# Patient Record
Sex: FEMALE | Race: WHITE | NOT HISPANIC OR LATINO | Employment: OTHER | URBAN - METROPOLITAN AREA
[De-identification: names, ages, dates, MRNs, and addresses within clinical notes are randomized per-mention and may not be internally consistent; named-entity substitution may affect disease eponyms.]

---

## 2024-06-17 ENCOUNTER — HOSPITAL ENCOUNTER (OUTPATIENT)
Dept: RADIOLOGY | Facility: CLINIC | Age: 64
Discharge: HOME | End: 2024-06-17
Attending: REHABILITATION UNIT
Payer: MEDICARE

## 2024-06-17 ENCOUNTER — OFFICE VISIT (OUTPATIENT)
Dept: ORTHOPEDICS | Facility: CLINIC | Age: 64
End: 2024-06-17
Payer: MEDICARE

## 2024-06-17 VITALS
BODY MASS INDEX: 30.05 KG/M2 | HEART RATE: 75 BPM | DIASTOLIC BLOOD PRESSURE: 72 MMHG | SYSTOLIC BLOOD PRESSURE: 107 MMHG | WEIGHT: 187 LBS | HEIGHT: 66 IN

## 2024-06-17 DIAGNOSIS — M25.511 RIGHT SHOULDER PAIN, UNSPECIFIED CHRONICITY: ICD-10-CM

## 2024-06-17 DIAGNOSIS — M25.561 RIGHT KNEE PAIN, UNSPECIFIED CHRONICITY: ICD-10-CM

## 2024-06-17 DIAGNOSIS — M25.511 RIGHT SHOULDER PAIN, UNSPECIFIED CHRONICITY: Primary | ICD-10-CM

## 2024-06-17 PROCEDURE — 99203 OFFICE O/P NEW LOW 30 MIN: CPT | Mod: ,,, | Performed by: REHABILITATION UNIT

## 2024-06-17 PROCEDURE — 3078F DIAST BP <80 MM HG: CPT | Mod: CPTII,,, | Performed by: REHABILITATION UNIT

## 2024-06-17 PROCEDURE — 73564 X-RAY EXAM KNEE 4 OR MORE: CPT | Mod: RT,,, | Performed by: REHABILITATION UNIT

## 2024-06-17 PROCEDURE — 1159F MED LIST DOCD IN RCRD: CPT | Mod: CPTII,,, | Performed by: REHABILITATION UNIT

## 2024-06-17 PROCEDURE — 3074F SYST BP LT 130 MM HG: CPT | Mod: CPTII,,, | Performed by: REHABILITATION UNIT

## 2024-06-17 PROCEDURE — 3008F BODY MASS INDEX DOCD: CPT | Mod: CPTII,,, | Performed by: REHABILITATION UNIT

## 2024-06-17 PROCEDURE — 73030 X-RAY EXAM OF SHOULDER: CPT | Mod: RT,,, | Performed by: REHABILITATION UNIT

## 2024-06-17 RX ORDER — APREMILAST 30 MG/1
TABLET, FILM COATED ORAL
COMMUNITY

## 2024-06-17 RX ORDER — DICLOFENAC SODIUM 30 MG/G
GEL TOPICAL
COMMUNITY
Start: 2024-05-06

## 2024-06-17 RX ORDER — DULOXETIN HYDROCHLORIDE 60 MG/1
60 CAPSULE, DELAYED RELEASE ORAL
COMMUNITY

## 2024-06-17 RX ORDER — TIZANIDINE HYDROCHLORIDE 4 MG/1
4 CAPSULE, GELATIN COATED ORAL 3 TIMES DAILY PRN
COMMUNITY

## 2024-06-17 RX ORDER — HYDROXYZINE HYDROCHLORIDE 25 MG/1
25 TABLET, FILM COATED ORAL 3 TIMES DAILY PRN
COMMUNITY
Start: 2024-01-24

## 2024-06-17 RX ORDER — SERTRALINE HYDROCHLORIDE 50 MG/1
50 TABLET, FILM COATED ORAL EVERY MORNING
COMMUNITY

## 2024-06-17 RX ORDER — BUSPIRONE HYDROCHLORIDE 5 MG/1
5 TABLET ORAL 3 TIMES DAILY PRN
COMMUNITY
Start: 2024-01-24

## 2024-06-17 RX ORDER — GABAPENTIN 400 MG/1
400 CAPSULE ORAL 3 TIMES DAILY
COMMUNITY

## 2024-06-17 NOTE — LETTER
June 17, 2024        Camron De Guzman MD  [address]   [address]  [facility] [address]              [facility]  [address] [place] [address], [address]  [facility] [address]  Phone: [phone]  Fax: [phone]   Patient: Leigh Oates   MR Number: [id number]   YOB: 1960   Date of Visit: 6/17/2024     Dear Dr. De Guzman,     Please see attached office visit note.     Sincerely,      Akbar Hannah MD            CC    No Recipients    Enclosure

## 2024-06-17 NOTE — PROGRESS NOTES
Subjective:      Patient ID: Leigh Oates is a 63 y.o. female.    Chief Complaint: Pain (Rt shoulder and rt knee sx 2021 - ongoing pain for about 2 months. Reports traveling pain from shoulder down into forearm. States had a injection May 24 in shoulder which did not help with pain. States knee pops, catches and gets stiff at times.  )    HPI:   Leigh Oates is a 63 y.o. female who presents today for initial evaluation of right shoulder and knee    History of Present Illness  The patient presents for evaluation of right shoulder and knee pain.    The patient reports experiencing pain in her right shoulder, which intensifies when she applies any pressure to it while seated. The pain, which radiates down her arm and into her elbow, has been persisting for the past few months. Despite being right-handed, she denies any specific injury that could have precipitated the pain. She has previously received an injection from Dr. Gary Calloway, which did not provide any relief. Despite the pain, she maintains an active lifestyle. She denies any numbness or tingling in her arm. She has not engaged in any physical therapy. She sees pain provider for back.     The patient underwent a knee replacement in 11/2021, performed by Dr. Diana. She reports an improvement in her condition post-surgery. However, she has been experiencing discomfort in her knee for the past 6 months. She describes a catching sensation in her knee, which becomes painful and pops when she bends it. She denies any complications or issues with wound healing post-surgery. She denies any swelling or warmth in her knee.    Supplemental Information  She has herniated disks in her neck and lumbar spine. She has an appointment with Dr. Holguin in 09/2023. She is seeing pain management for her back. She is scheduled for an epidural injection in her lumbar spine on Thursday.    History reviewed. No pertinent past medical history.  Past Surgical History:   Procedure  Laterality Date     SECTION  1979, 1982,1984    CHOLECYSTECTOMY  2019    HERNIA REPAIR  2019    Done with Gall bladder    JOINT REPLACEMENT  2021    TUBAL LIGATION  1984     Social History     Socioeconomic History    Marital status:    Tobacco Use    Smoking status: Former     Current packs/day: 0.00     Types: Cigarettes     Quit date: 1978     Years since quittin.9    Smokeless tobacco: Never   Substance and Sexual Activity    Alcohol use: Not Currently    Drug use: Not Currently    Sexual activity: Not Currently   Social History Narrative    ** Merged History Encounter **          Social Determinants of Health     Financial Resource Strain: Medium Risk (11/15/2022)    [facility]    Overall Financial Resource Strain (CARDIA)     Difficulty of Paying Living Expenses: Somewhat hard   Food Insecurity: Food Insecurity Present (11/15/2022)    Received from [facility] and Affiliates    Hunger Vital Sign     Worried About Running Out of Food in the Last Year: Often true     Ran Out of Food in the Last Year: Often true   Transportation Needs: No Transportation Needs (11/15/2022)    Received from [facility] and [facility]    PRAPARE - Transportation     Lack of Transportation (Medical): No     Lack of Transportation (Non-Medical): No   Physical Activity: Inactive (11/15/2022)    [facility]    Exercise Vital Sign     Days of Exercise per Week: 0 days     Minutes of Exercise per Session: 0 min   Stress: Stress Concern Present (11/15/2022)    Received from [facility] and [facility]    [facility]  "Occupational Health - Occupational Stress Questionnaire     Feeling of Stress : To some extent         Current Outpatient Medications:     busPIRone (BUSPAR) 5 MG Tab, Take 5 mg by mouth 3 (three) times daily as needed., Disp: , Rfl:     diclofenac sodium (SOLARAZE) 3 % gel, Apply to affected area 3-4x/day, Disp: , Rfl:     DULoxetine (CYMBALTA) 60 MG capsule, Take 60 mg by mouth., Disp: , Rfl:     gabapentin (NEURONTIN) 400 MG capsule, Take 400 mg by mouth 3 (three) times daily., Disp: , Rfl:     hydrOXYzine HCL (ATARAX) 25 MG tablet, Take 25 mg by mouth 3 (three) times daily as needed., Disp: , Rfl:     OTEZLA 30 mg Tab, , Disp: , Rfl:     sertraline (ZOLOFT) 50 MG tablet, Take 50 mg by mouth every morning., Disp: , Rfl:     tiZANidine 4 mg Cap, Take 4 mg by mouth 3 (three) times daily as needed., Disp: , Rfl:   Review of patient's allergies indicates:   Allergen Reactions    Nsaids (non-steroidal anti-inflammatory drug)        /72 (BP Location: Left arm, Patient Position: Sitting, BP Method: Large (Automatic))   Pulse 75   Ht 5' 6" (1.676 m)   Wt 84.8 kg (187 lb)   BMI 30.18 kg/m²     Comprehensive review of systems completed and negative except as per HPI.        Objective:   Head: Normocephalic, without obvious abnormality, atraumatic  Eyes: conjunctivae/corneas clear. EOM's intact  Ears: normal external appearance  Nose: Nares normal. Septum midline. Mucosa normal. No drainage  Throat: normal findings: lips normal without lesions  Lungs: unlabored breathing on room air  Chest wall: symmetric chest rise  Heart: regular rate and rhythm  Pulses: 2+ and symmetric  Skin: Skin color, texture, turgor normal. No rashes or lesions  Neurologic: Grossly normal    right SHOULDER    Appearance:   normal    Cervical Spine:   Decreased motion with pain    Tenderness:   diffuse    AROM:   , Abduction 150, ER 60, IR sacrum    PROM:  same    Pain:  AROM: Positive  PROM:  Positive  End ROM: " Positive  Supraspinatus strength testing: Negative  External rotation strength testing: Negative  Trisha-scapular: Negative  Virtually all provacative maneuvers Negative    Strength:  Supraspinatus: intact  External rotation: intact  Trisha-scapular: intact    Provocative Maneuvers:     Rotator Cuff/Biceps/AC Joint  Neer's Sign: Positive  Hawkin's Test: Positive  Painful arc: Positive  Belly Press: Negative  Bear Hugger Test: Negative  Hornblower's Sign: Negative  Speed's Test: Positive  Yergeson's Test: Positive  Cross Arm Abduction: Positive    Pulses: Palpable radial pulse    Neurological deficits: None    The patient has a warm and well-perfused upper extremity with capillary refill less than 2 seconds. Sensation is intact to light touch in terminal nerve distributions. 5/5 ain/pin/uln. The patient has no palpable epitrochlear lymphadenopathy.    right KNEE:     Alignment: neutral  Appearance: skin is intact without lesion with well healed surgical incision   Effusion: none  Gait: antalgic  Straight Leg Raise: negative  Log Roll: negative  Hip ROM: full and painless  Ankle ROM: full and painless   Knee ROM:  0 - 125  Tenderness: mild diffuse TTP    Patellar Mobility: normal  Valgus Stress @ 0 deg: stable  Valgus Stress @ 30 deg: stable  Varus Stress @ 0 deg: stable  Varus Stress @ 30 deg: stable  Lachman: stable  Ant Drawer: negative   Post Drawer: negative  Posterior Sag Sign: negative  Neurological deficits: SILT dp/sp/t distributions  Motor: 5/5 EHL/FHL/TA/GS    Warm well perfused lower extremity with capillary refill less than 2 seconds and sensation intact to light touch in the terminal nerve distributions. Calf soft and easily compressible without clinical sign of DVT. No palpable popliteal lymphadenopathy      Assessment:     Imaging:   Four view radiographs of the right shoulder obtained today personally reviewed showing no acute fractures or dislocations.  Joint spaces are well maintained.  Humeral head  remains seated centrally within the glenoid.    Four view weight bearing radiographs of the right knee obtained today personally reviewed showing no acute fractures or dislocations. TKA components in place with no gross signs of loosening or failure.         1. Right shoulder pain, unspecified chronicity    2. Right knee pain, unspecified chronicity          Plan:       Orders Placed This Encounter    X-ray Shoulder 2 or More Views Right    X-Ray Knee Complete 4 Or More Views Right    Ambulatory referral/consult to Physical/Occupational Therapy        Assessment & Plan  1. Right shoulder and knee pain.  Imaging and exam findings discussed. Upon examination, she has good motion and strength to the shoulder. She does have some degenerative changes to her cervical spine and her complaints fit with c spine origin. Physical therapy was recommended to strengthen the shoulder and neck.  A referral for physical therapy at [facility] in [facility] was issued. She will keep appointment with Dr. Holguin. I will see her back after his evaluation. She is going to follow up with one of my arthroplasty partners if she has further knee issues. Avoid aggravating activities. Symptomatic management with RICE and OTC meds. Questions were answered and she was in agreement.     This note was generated with the assistance of ambient listening technology. Verbal consent was obtained by the patient and accompanying visitor(s) for the recording of patient appointment to facilitate this note. I attest to having reviewed and edited the generated note for accuracy, though some syntax or spelling errors may persist. Please contact the author of this note for any clarification.

## 2024-09-13 ENCOUNTER — TELEPHONE (OUTPATIENT)
Dept: ORTHOPEDICS | Facility: CLINIC | Age: 64
End: 2024-09-13
Payer: MEDICARE

## 2024-10-29 DIAGNOSIS — M54.50 LOW BACK PAIN, UNSPECIFIED BACK PAIN LATERALITY, UNSPECIFIED CHRONICITY, UNSPECIFIED WHETHER SCIATICA PRESENT: Primary | ICD-10-CM

## 2024-10-29 DIAGNOSIS — M54.9 MID BACK PAIN: ICD-10-CM

## 2024-10-29 DIAGNOSIS — M54.2 CERVICAL PAIN (NECK): ICD-10-CM

## 2024-11-04 ENCOUNTER — OFFICE VISIT (OUTPATIENT)
Dept: ORTHOPEDIC SURGERY | Facility: CLINIC | Age: 64
End: 2024-11-04
Payer: MEDICARE

## 2024-11-04 ENCOUNTER — HOSPITAL ENCOUNTER (OUTPATIENT)
Dept: RADIOLOGY | Facility: CLINIC | Age: 64
Discharge: HOME | End: 2024-11-04
Attending: ORTHOPAEDIC SURGERY
Payer: MEDICARE

## 2024-11-04 VITALS — WEIGHT: 186.94 LBS | BODY MASS INDEX: 30.04 KG/M2 | HEIGHT: 66 IN

## 2024-11-04 DIAGNOSIS — M50.30 DDD (DEGENERATIVE DISC DISEASE), CERVICAL: ICD-10-CM

## 2024-11-04 DIAGNOSIS — M54.2 CERVICAL PAIN (NECK): ICD-10-CM

## 2024-11-04 DIAGNOSIS — M54.50 LOW BACK PAIN, UNSPECIFIED BACK PAIN LATERALITY, UNSPECIFIED CHRONICITY, UNSPECIFIED WHETHER SCIATICA PRESENT: ICD-10-CM

## 2024-11-04 DIAGNOSIS — M48.02 SPINAL STENOSIS, CERVICAL REGION: ICD-10-CM

## 2024-11-04 DIAGNOSIS — M47.812 CERVICAL SPONDYLOSIS: Primary | ICD-10-CM

## 2024-11-04 DIAGNOSIS — M54.12 CERVICAL RADICULOPATHY: ICD-10-CM

## 2024-11-04 DIAGNOSIS — M54.9 MID BACK PAIN: ICD-10-CM

## 2024-11-04 DIAGNOSIS — M54.6 PAIN IN THORACIC SPINE: ICD-10-CM

## 2024-11-04 PROCEDURE — 72050 X-RAY EXAM NECK SPINE 4/5VWS: CPT | Mod: ,,, | Performed by: ORTHOPAEDIC SURGERY

## 2024-11-04 PROCEDURE — 72070 X-RAY EXAM THORAC SPINE 2VWS: CPT | Mod: ,,, | Performed by: ORTHOPAEDIC SURGERY

## 2024-11-04 PROCEDURE — 72110 X-RAY EXAM L-2 SPINE 4/>VWS: CPT | Mod: ,,, | Performed by: ORTHOPAEDIC SURGERY

## 2024-11-04 RX ORDER — LIDOCAINE 50 MG/G
OINTMENT TOPICAL
COMMUNITY
Start: 2024-06-20

## 2024-11-04 RX ORDER — DICLOFENAC SODIUM 10 MG/G
GEL TOPICAL
COMMUNITY
Start: 2024-06-20

## 2024-11-05 NOTE — PROGRESS NOTES
DATE: 2024  PATIENT: Leigh Oates    Attending Physician: Fredrick Holguin M.D.    CHIEF COMPLAINT: neck and RUE pain    HISTORY:  Leigh Oates is a 64 y.o. female presents for initial evaluation of neck and right arm pain (Neck - 8, Arm - 8). The pain has been present for years but it was exacerbated 3 days ago after she fell. The patient describes the pain as dull and it radiates down RUE to the fingers. The pain is worse with activity and improved by rest. There is RUE associated numbness and tingling. There is no subjective weakness. Prior treatments have included meds (gabapentin), PT, SNEHAL, but no surgery.     The Patient denies myelopathic symptoms such as handwriting changes or difficulty with buttons/coins/keys. Denies perineal paresthesias, bowel/bladder dysfunction.    The patient does not smoke, have DM or endorse IVDU. The patient is not on any blood thinners and does not take chronic narcotics. She is a retired .    PAST MEDICAL/SURGICAL HISTORY:  History reviewed. No pertinent past medical history.  Past Surgical History:   Procedure Laterality Date     SECTION  1979, 1982,1984    CHOLECYSTECTOMY  2019    HERNIA REPAIR  2019    Done with Gall bladder    JOINT REPLACEMENT  2021    TUBAL LIGATION  1984       Current Medications:   Current Outpatient Medications:     busPIRone (BUSPAR) 5 MG Tab, Take 5 mg by mouth 3 (three) times daily as needed., Disp: , Rfl:     diclofenac sodium (VOLTAREN) 1 % Gel, Apply topically., Disp: , Rfl:     DULoxetine (CYMBALTA) 60 MG capsule, Take 60 mg by mouth., Disp: , Rfl:     gabapentin (NEURONTIN) 400 MG capsule, Take 400 mg by mouth 3 (three) times daily., Disp: , Rfl:     hydrOXYzine HCL (ATARAX) 25 MG tablet, Take 25 mg by mouth 3 (three) times daily as needed., Disp: , Rfl:     LIDOcaine (XYLOCAINE) 5 % Oint ointment, Apply topically., Disp: , Rfl:     OTEZLA 30 mg Tab, , Disp: , Rfl:     sertraline (ZOLOFT)  50 MG tablet, Take 50 mg by mouth every morning., Disp: , Rfl:     tiZANidine 4 mg Cap, Take 4 mg by mouth 3 (three) times daily as needed., Disp: , Rfl:     Social History:   Social History     Socioeconomic History    Marital status:    Tobacco Use    Smoking status: Former     Current packs/day: 0.00     Types: Cigarettes     Quit date: 1978     Years since quittin.3    Smokeless tobacco: Never   Substance and Sexual Activity    Alcohol use: Not Currently    Drug use: Not Currently    Sexual activity: Not Currently   Social History Narrative    ** Merged History Encounter **          Social Drivers of Health     Financial Resource Strain: Medium Risk (11/15/2022)    [facility]    Overall Financial Resource Strain (CARDIA)     Difficulty of Paying Living Expenses: Somewhat hard   Food Insecurity: Food Insecurity Present (11/15/2022)    Received from [facility] and Aff[facility]ates    Hunger Vital Sign     Worried About Running Out of Food in the Last Year: Often true     Ran Out of Food in the Last Year: Often true   Transportation Needs: No Transportation Needs (11/15/2022)    Received from [facility]    PRAPARE - Transportation     Lack of Transportation (Medical): No     Lack of Transportation (Non-Medical): No   Physical Activity: Inactive (11/15/2022)    [facility]    Exercise Vital Sign     Days of Exercise per Week: 0 days     Minutes of Exercise per Session: 0 min   Stress: Stress Concern Present (11/15/2022)    Received from [facility]    [employer] [facility]ational Health - Occupational Stress  "Questionnaire     Feeling of Stress : To some extent       REVIEW OF SYSTEMS:  Constitution: Negative. Negative for chills, fever and night sweats.   Cardiovascular: Negative for chest pain and syncope.   Respiratory: Negative for cough and shortness of breath.   Gastrointestinal: See HPI. Negative for nausea/vomiting. Negative for abdominal pain.  Genitourinary: See HPI. Negative for discoloration or dysuria.  Skin: Negative for dry skin, itching and rash.   Hematologic/Lymphatic: negative for bleeding/clotting disorders.   Musculoskeletal: Negative for falls and muscle weakness.   Neurological: See HPI. no history of seizures. no history of cranial surgery or shunts.  Endocrine: Negative for polydipsia, polyphagia and polyuria.   Allergic/Immunologic: Negative for hives and persistent infections.  Psychiatric/Behavioral: Negative for depression and insomnia.         EXAM:  Ht 5' 6" (1.676 m)   Wt 84.8 kg (186 lb 15.2 oz)   BMI 30.17 kg/m²     General: The patient is a 64 y.o. female in no apparent distress, the patient is orientatied to person, place and time.  Psych: Normal mood and affect  HEENT: Vision grossly intact, hearing intact to the spoken word.  Lungs: Respirations unlabored.  Gait: Normal station and gait, no difficulty with toe or heel walk.   Skin: Cervical skin negative for rashes, lesions, hairy patches and surgical scars.  Range of motion: Cervical range of motion is acceptable. There is posterior cervical tenderness to palpation.  Spinal Balance: Global saggital and coronal spinal balance acceptable, no significant for scoliosis and kyphosis.  Musculoskeletal: No pain with the range of motion of the bilateral shoulders and elbows. Normal bulk and contour of the bilateral hands.  Vascular: Bilateral hands warm and well perfused, Radial pulses 2+ bilaterally.  Neurological: Normal strength and tone in all major motor groups in the bilateral upper and lower extremities. Normal sensation to light " touch in the C5-T1 and L2-S1 dermatomes bilaterally.  Deep tendon reflexes symmetric 2+ in the bilateral upper and lower extremities.  Negative Inverted Radial Reflex and Verma's bilaterally. Negative Babinski bilaterally.     IMAGING:   Today I independently reviewed the following images and my interpretations are as follows:    AP, Lat and Flex/Ex  upright C-spine films demonstrate spondylosis and DDD. Curvature at CT junction. Hard to visualize any defect such as annia-vert.     Body mass index is 30.17 kg/m².  Hemoglobin A1C   Date Value Ref Range Status   11/20/2023 5.1 4.8 - 5.6 % Final     Comment:              Prediabetes: 5.7 - 6.4           Diabetes: >6.4           Glycemic control for adults with diabetes: <7.0       ASSESSMENT/PLAN:  Cervical spondylosis    DDD (degenerative disc disease), cervical    Cervical radiculopathy    Spinal stenosis, cervical region  -     CT Cervical Spine Without Contrast; Future; Expected date: 11/04/2024    Pain in thoracic spine  -     CT Thoracic Spine Without Contrast; Future; Expected date: 11/04/2024      Follow up in about 4 weeks (around 12/2/2024).    Patient has cervical spondylosis and RUE radiculopathy. I discussed the natural history of their diagnoses as well as surgical and nonsurgical treatment options. I educated the patient on the importance of core/back strengthening, correct posture, bending/lifting ergonomics, and low-impact aerobic exercises (walking, elliptical, and aquatherapy). Continue medications. I ordered Cervico-thoracic CT to better define the anatomy. She will drop off her MRI CD. Patient will follow up in 4 weeks for imaging review.    Fredrick Holguin MD  Orthopaedic Spine Surgeon  Department of Orthopaedic Surgery  [phone]

## 2024-12-02 ENCOUNTER — OFFICE VISIT (OUTPATIENT)
Dept: ORTHOPEDIC SURGERY | Facility: CLINIC | Age: 64
End: 2024-12-02
Attending: ORTHOPAEDIC SURGERY
Payer: MEDICARE

## 2024-12-02 DIAGNOSIS — M47.812 CERVICAL SPONDYLOSIS: Primary | ICD-10-CM

## 2024-12-02 DIAGNOSIS — M43.9 DEFORMITY OF CERVICAL VERTEBRA: ICD-10-CM

## 2024-12-02 PROCEDURE — 1159F MED LIST DOCD IN RCRD: CPT | Mod: CPTII,S$GLB,, | Performed by: ORTHOPAEDIC SURGERY

## 2024-12-02 PROCEDURE — 99214 OFFICE O/P EST MOD 30 MIN: CPT | Mod: S$GLB,,, | Performed by: ORTHOPAEDIC SURGERY

## 2024-12-02 PROCEDURE — 1160F RVW MEDS BY RX/DR IN RCRD: CPT | Mod: CPTII,S$GLB,, | Performed by: ORTHOPAEDIC SURGERY

## 2024-12-02 NOTE — PROGRESS NOTES
DATE: 12/2/2024  PATIENT: Leigh Oates    Attending Physician: Fredrick Holguin M.D.    HISTORY:  Leigh Oates is a 64 y.o. female who returns to me today for imaging review. Patient continues to have neck pain that radiates down RUE to the fingers. She has been taking meds and doing exercises with some relief. She would like to continue conservative management.    The Patient denies myelopathic symptoms such as handwriting changes or difficulty with buttons/coins/keys. Denies perineal paresthesias, bowel/bladder dysfunction.    The patient does not smoke, have DM or endorse IVDU. The patient is not on any blood thinners and does not take chronic narcotics. She is a retired .    PMH/PSH/FamHx/SocHx:  Unchanged from prior visit    ROS:  Positive for neck and RUE pain  Denies myelopathic symptoms, perineal paresthesias, bowel or bladder incontinence    EXAM:  There were no vitals taken for this visit.    My physical examination was notable for the following findings: motor intact BUE; SILT    IMAGING:  Today I independently reviewed the following images and my interpretations are as follows:    Previous AP and Lat upright C-spine films showed spondylosis and DDD. Curvature at CT junction.    MRI cervical showed no significant stenosis.    CT C-T showed T1-5 coronal deformity with curvature of 42deg.    ASSESSMENT/PLAN:  Patient has cervical spondylosis and deformity. I discussed the natural history of their diagnoses as well as surgical and nonsurgical treatment options. I educated the patient on the importance of core/back strengthening, correct posture, bending/lifting ergonomics, and low-impact aerobic exercises (walking, elliptical, and aquatherapy). Continue medications and exercises. Patient will follow up PRN.    I have provided the patient with a home exercise program. It is the [employer] cervical exercise program. Exercises include walking erectly with neutral head position, supine  neutral head position, supine retraction, sitting or standing neck retraction, posture training, forward/backward/sideward isometric strengthening, prone head lifts, supine head lifts, scapular retraction, and neck rotation. Pt will complete each exercise twice daily for 6-8 weeks.    Fredrick Holguin MD  Orthopaedic Spine Surgeon  Department of Orthopaedic Surgery  [phone]

## 2025-02-07 ENCOUNTER — HOSPITAL ENCOUNTER (OUTPATIENT)
Facility: HOSPITAL | Age: 65
Discharge: HOME | End: 2025-02-07
Attending: PODIATRIST | Admitting: PODIATRIST
Payer: MEDICARE

## 2025-02-07 ENCOUNTER — ANESTHESIA EVENT (OUTPATIENT)
Dept: SURGERY | Facility: HOSPITAL | Age: 65
End: 2025-02-07
Payer: MEDICARE

## 2025-02-07 ENCOUNTER — ANESTHESIA (OUTPATIENT)
Dept: SURGERY | Facility: HOSPITAL | Age: 65
End: 2025-02-07
Payer: MEDICARE

## 2025-02-07 DIAGNOSIS — M20.21 HALLUX RIGIDUS OF RIGHT FOOT: Primary | ICD-10-CM

## 2025-02-07 PROBLEM — M79.671 RIGHT FOOT PAIN: Status: ACTIVE | Noted: 2025-02-07

## 2025-02-07 PROCEDURE — 71000016 HC POSTOP RECOV ADDL HR: Performed by: PODIATRIST

## 2025-02-07 PROCEDURE — 36000708 HC OR TIME LEV III 1ST 15 MIN: Performed by: PODIATRIST

## 2025-02-07 PROCEDURE — 63600175 PHARM REV CODE 636 W HCPCS: Performed by: NURSE ANESTHETIST, CERTIFIED REGISTERED

## 2025-02-07 PROCEDURE — 71000033 HC RECOVERY, INTIAL HOUR: Performed by: PODIATRIST

## 2025-02-07 PROCEDURE — 71000015 HC POSTOP RECOV 1ST HR: Performed by: PODIATRIST

## 2025-02-07 PROCEDURE — 36000709 HC OR TIME LEV III EA ADD 15 MIN: Performed by: PODIATRIST

## 2025-02-07 PROCEDURE — 63600175 PHARM REV CODE 636 W HCPCS

## 2025-02-07 PROCEDURE — 37000009 HC ANESTHESIA EA ADD 15 MINS: Performed by: PODIATRIST

## 2025-02-07 PROCEDURE — 63600175 PHARM REV CODE 636 W HCPCS: Performed by: PODIATRIST

## 2025-02-07 PROCEDURE — C1713 ANCHOR/SCREW BN/BN,TIS/BN: HCPCS | Performed by: PODIATRIST

## 2025-02-07 PROCEDURE — 27201423 OPTIME MED/SURG SUP & DEVICES STERILE SUPPLY: Performed by: PODIATRIST

## 2025-02-07 PROCEDURE — D9220A PRA ANESTHESIA: Mod: ANES,,, | Performed by: STUDENT IN AN ORGANIZED HEALTH CARE EDUCATION/TRAINING PROGRAM

## 2025-02-07 PROCEDURE — D9220A PRA ANESTHESIA: Mod: CRNA,,, | Performed by: NURSE ANESTHETIST, CERTIFIED REGISTERED

## 2025-02-07 PROCEDURE — 63600175 PHARM REV CODE 636 W HCPCS: Performed by: STUDENT IN AN ORGANIZED HEALTH CARE EDUCATION/TRAINING PROGRAM

## 2025-02-07 PROCEDURE — 25000003 PHARM REV CODE 250: Performed by: NURSE ANESTHETIST, CERTIFIED REGISTERED

## 2025-02-07 PROCEDURE — 37000008 HC ANESTHESIA 1ST 15 MINUTES: Performed by: PODIATRIST

## 2025-02-07 RX ORDER — MIDAZOLAM HYDROCHLORIDE 2 MG/2ML
2 INJECTION, SOLUTION INTRAMUSCULAR; INTRAVENOUS ONCE
Status: COMPLETED | OUTPATIENT
Start: 2025-02-07 | End: 2025-02-07

## 2025-02-07 RX ORDER — PROCHLORPERAZINE EDISYLATE 5 MG/ML
5 INJECTION INTRAMUSCULAR; INTRAVENOUS EVERY 30 MIN PRN
Status: DISCONTINUED | OUTPATIENT
Start: 2025-02-07 | End: 2025-02-07 | Stop reason: HOSPADM

## 2025-02-07 RX ORDER — ONDANSETRON HYDROCHLORIDE 2 MG/ML
INJECTION, SOLUTION INTRAMUSCULAR; INTRAVENOUS
Status: DISCONTINUED | OUTPATIENT
Start: 2025-02-07 | End: 2025-02-07

## 2025-02-07 RX ORDER — PROPOFOL 10 MG/ML
VIAL (ML) INTRAVENOUS
Status: DISCONTINUED | OUTPATIENT
Start: 2025-02-07 | End: 2025-02-07

## 2025-02-07 RX ORDER — DEXAMETHASONE SODIUM PHOSPHATE 4 MG/ML
INJECTION, SOLUTION INTRA-ARTICULAR; INTRALESIONAL; INTRAMUSCULAR; INTRAVENOUS; SOFT TISSUE
Status: DISCONTINUED | OUTPATIENT
Start: 2025-02-07 | End: 2025-02-07

## 2025-02-07 RX ORDER — MIDAZOLAM HYDROCHLORIDE 2 MG/2ML
INJECTION, SOLUTION INTRAMUSCULAR; INTRAVENOUS
Status: COMPLETED
Start: 2025-02-07 | End: 2025-02-07

## 2025-02-07 RX ORDER — HYDROMORPHONE HYDROCHLORIDE 2 MG/ML
0.4 INJECTION, SOLUTION INTRAMUSCULAR; INTRAVENOUS; SUBCUTANEOUS EVERY 5 MIN PRN
Status: DISCONTINUED | OUTPATIENT
Start: 2025-02-07 | End: 2025-02-07 | Stop reason: HOSPADM

## 2025-02-07 RX ORDER — LIDOCAINE HYDROCHLORIDE 20 MG/ML
INJECTION, SOLUTION EPIDURAL; INFILTRATION; INTRACAUDAL; PERINEURAL
Status: DISCONTINUED | OUTPATIENT
Start: 2025-02-07 | End: 2025-02-07

## 2025-02-07 RX ORDER — LIDOCAINE HYDROCHLORIDE 10 MG/ML
INJECTION, SOLUTION EPIDURAL; INFILTRATION; INTRACAUDAL; PERINEURAL
Status: DISCONTINUED | OUTPATIENT
Start: 2025-02-07 | End: 2025-02-07

## 2025-02-07 RX ORDER — BUPIVACAINE HYDROCHLORIDE 5 MG/ML
INJECTION, SOLUTION EPIDURAL; INTRACAUDAL
Status: DISCONTINUED
Start: 2025-02-07 | End: 2025-02-07 | Stop reason: HOSPADM

## 2025-02-07 RX ORDER — CLINDAMYCIN PHOSPHATE 600 MG/50ML
INJECTION, SOLUTION INTRAVENOUS
Status: DISCONTINUED
Start: 2025-02-07 | End: 2025-02-07 | Stop reason: HOSPADM

## 2025-02-07 RX ORDER — HYDROCODONE BITARTRATE AND ACETAMINOPHEN 5; 325 MG/1; MG/1
1 TABLET ORAL EVERY 6 HOURS PRN
Qty: 20 TABLET | Refills: 0 | Status: SHIPPED | OUTPATIENT
Start: 2025-02-07

## 2025-02-07 RX ORDER — CEPHALEXIN 500 MG/1
500 CAPSULE ORAL EVERY 6 HOURS
Qty: 20 CAPSULE | Refills: 0 | Status: SHIPPED | OUTPATIENT
Start: 2025-02-07

## 2025-02-07 RX ORDER — METOCLOPRAMIDE HYDROCHLORIDE 5 MG/ML
10 INJECTION INTRAMUSCULAR; INTRAVENOUS EVERY 10 MIN PRN
Status: DISCONTINUED | OUTPATIENT
Start: 2025-02-07 | End: 2025-02-07 | Stop reason: HOSPADM

## 2025-02-07 RX ORDER — FENTANYL CITRATE 50 UG/ML
INJECTION, SOLUTION INTRAMUSCULAR; INTRAVENOUS
Status: DISCONTINUED | OUTPATIENT
Start: 2025-02-07 | End: 2025-02-07

## 2025-02-07 RX ORDER — CLINDAMYCIN PHOSPHATE 600 MG/50ML
600 INJECTION, SOLUTION INTRAVENOUS ONCE
Status: COMPLETED | OUTPATIENT
Start: 2025-02-07 | End: 2025-02-07

## 2025-02-07 RX ORDER — DIPHENHYDRAMINE HYDROCHLORIDE 50 MG/ML
25 INJECTION INTRAMUSCULAR; INTRAVENOUS EVERY 6 HOURS PRN
Status: DISCONTINUED | OUTPATIENT
Start: 2025-02-07 | End: 2025-02-07 | Stop reason: HOSPADM

## 2025-02-07 RX ORDER — BUPIVACAINE HYDROCHLORIDE 5 MG/ML
INJECTION, SOLUTION EPIDURAL; INTRACAUDAL
Status: DISCONTINUED | OUTPATIENT
Start: 2025-02-07 | End: 2025-02-07 | Stop reason: HOSPADM

## 2025-02-07 RX ORDER — METHOCARBAMOL 100 MG/ML
1000 INJECTION, SOLUTION INTRAMUSCULAR; INTRAVENOUS ONCE AS NEEDED
Status: COMPLETED | OUTPATIENT
Start: 2025-02-07 | End: 2025-02-07

## 2025-02-07 RX ADMIN — FENTANYL CITRATE 100 MCG: 50 INJECTION, SOLUTION INTRAMUSCULAR; INTRAVENOUS at 08:02

## 2025-02-07 RX ADMIN — METHOCARBAMOL 1000 MG: 100 INJECTION INTRAMUSCULAR; INTRAVENOUS at 08:02

## 2025-02-07 RX ADMIN — FENTANYL CITRATE 25 MCG: 50 INJECTION, SOLUTION INTRAMUSCULAR; INTRAVENOUS at 08:02

## 2025-02-07 RX ADMIN — FENTANYL CITRATE 25 MCG: 50 INJECTION, SOLUTION INTRAMUSCULAR; INTRAVENOUS at 07:02

## 2025-02-07 RX ADMIN — DEXAMETHASONE SODIUM PHOSPHATE 4 MG: 4 INJECTION, SOLUTION INTRA-ARTICULAR; INTRALESIONAL; INTRAMUSCULAR; INTRAVENOUS; SOFT TISSUE at 07:02

## 2025-02-07 RX ADMIN — ONDANSETRON 4 MG: 2 INJECTION INTRAMUSCULAR; INTRAVENOUS at 07:02

## 2025-02-07 RX ADMIN — LIDOCAINE HYDROCHLORIDE 50 MG: 10 INJECTION, SOLUTION EPIDURAL; INFILTRATION; INTRACAUDAL; PERINEURAL at 07:02

## 2025-02-07 RX ADMIN — CLINDAMYCIN IN 5 PERCENT DEXTROSE 600 MG: 12 INJECTION, SOLUTION INTRAVENOUS at 07:02

## 2025-02-07 RX ADMIN — MIDAZOLAM HYDROCHLORIDE 2 MG: 1 INJECTION, SOLUTION INTRAMUSCULAR; INTRAVENOUS at 06:02

## 2025-02-07 RX ADMIN — FENTANYL CITRATE 50 MCG: 50 INJECTION, SOLUTION INTRAMUSCULAR; INTRAVENOUS at 07:02

## 2025-02-07 RX ADMIN — PROPOFOL 150 MG: 10 INJECTION, EMULSION INTRAVENOUS at 07:02

## 2025-02-07 RX ADMIN — SODIUM CHLORIDE: 9 INJECTION, SOLUTION INTRAVENOUS at 07:02

## 2025-02-07 RX ADMIN — MIDAZOLAM HYDROCHLORIDE 2 MG: 2 INJECTION, SOLUTION INTRAMUSCULAR; INTRAVENOUS at 06:02

## 2025-02-07 NOTE — TRANSFER OF CARE
"Anesthesia Transfer of Care Note    Patient: Leigh Oates    Procedure(s) Performed: Procedure(s) (LRB):  FUSION, JOINT, MIDFOOT  / 1st MPJ Right (Right)    Patient location: PACU    Anesthesia Type: general    Transport from OR: Transported from OR on room air with adequate spontaneous ventilation    Post pain: adequate analgesia    Post assessment: no apparent anesthetic complications    Post vital signs: stable    Level of consciousness: sedated    Nausea/Vomiting: no nausea/vomiting    Complications: none    Transfer of care protocol was followed      Last vitals: Visit Vitals  BP (!) 118/99   Pulse 99   Temp 36.5 °C (97.7 °F)   Resp 17   Ht 5' 3" (1.6 m)   Wt 84.2 kg (185 lb 10 oz)   SpO2 99%   Breastfeeding No   BMI 32.88 kg/m²     "

## 2025-02-07 NOTE — ANESTHESIA POSTPROCEDURE EVALUATION
Anesthesia Post Evaluation    Patient: Leigh Oates    Procedure(s) Performed: Procedure(s) (LRB):  FUSION, JOINT, MIDFOOT  / 1st MPJ Right (Right)    Final Anesthesia Type: general      Patient location during evaluation: PACU  Patient participation: Yes- Able to Participate  Level of consciousness: awake and alert  Post-procedure vital signs: reviewed and stable  Pain management: adequate  Airway patency: patent    PONV status at discharge: No PONV  Anesthetic complications: no      Respiratory status: unassisted  Hydration status: euvolemic  Follow-up not needed.              Vitals Value Taken Time   /68 02/07/25 0949   Temp 36.5 °C (97.7 °F) 02/07/25 0900   Pulse 73 02/07/25 0949   Resp 18 02/07/25 0900   SpO2 97 % 02/07/25 0949         Event Time   Out of Recovery 08:58:00         Pain/Lui Score: Lui Score: 10 (2/7/2025  9:00 AM)

## 2025-02-07 NOTE — OP NOTE
Date:2/7/25    Pre Operative Diagnosis:  Hallux rigidus right foot    Post Operative Diagnosis:  Same    Pathology:  None    Anesthesia:  General    Hemostasis:  Esmarch bandage    Estimated Blood Loss:  10 cc    Materials used:  Trace 1st metatarsophalangeal fusion plate with 2 screws    On the above mentioned date, the patient was deemed satisfactory for surgery.  The patient was placed on the operative table in a supine position and anesthesia was administered.  After adequate levels of anesthesia was administered, a local field block was given.  The lower leg was then prepped and draped in the usual sterile fashion and then returned to the operative site for the following procedure.        Procedure 1.:  1st MPJ fusion right foot     Attention was directed to the dorsum aspect of the right foot where a 6 cm linear incision was created.  This incision was carefully deepened through layers of subcutaneous tissue.  At the level of the deep fascia a periosteal incision was created.  At this point a large amount of osseous spurring was identified at the 1st metatarsophalangeal joint and removed.  A rasp was then used utilized to remove all cartilaginous tissues from each side of the joint.  The joint was then fenestrated.  Fluoroscopy did reveal good bony contact between the 2 areas therefore temporary fixation was placed and this was followed by permanent fixation with a speed plate with 2 screws.  Fluoroscopy was utilized throughout the procedure to ensure adequate placement of the 1st metatarsophalangeal joint as well as the permanent fixation.  The area was then irrigated and closed via layered closure.      In summary patient tolerated anesthesia and procedure well and left the operating room with vital signs stable and vascular status intact.  Patient is transported to the recovery room for continued monitoring keratome criteria for discharge.  Patient will be discharged home and advised to follow up me on  Monday

## 2025-02-07 NOTE — DISCHARGE INSTRUCTIONS
FOLLOW DR CALVERT'S INSTRUCTIONS GIVEN TO YOU  PRE-OP                                                                                                                FOOT SURGERY HOME CARE INSTRUCTIONS     ACTIVITY. Keep your affected foot elevated above the level of your heart over the next 48 hours. Apply an ice bag as often as possible for the next 48 hours. If you received a surgical boot or shoe, wear it until your next visit or until instructed otherwise. You may remove the surgical boot or shoe when resting and to sleep. Exercise your legs frequently by bending your knees to stimulate circulation and speed healing. You may put partial weight on your affected foot.     DIET. At home, continue with liquids. If you do not have any nausea, you may eat a soft diet. Gradually resume your regular diet. Do not drink alcohol beverages.     CARE OF INCISION. Keep your bandage clean, dry, and intact until your follow up appointment. Do not remove your bandages or inspect the wound. A small amount of blood on the bandage is normal. Limited swelling may occur and your skin may look bruised. Limited swelling and bruising are normal and expected. Do not smoke.     BATHING. Sponge bathe until your follow up appointment. You may take a shower when told by your doctor. No tub baths, swimming, or hot tubs for 2 weeks or when told by your doctor.     MEDICATION. You will receive instructions for your pain and/or antibiotic prescriptions. Take pain medication only if you need it and as directed. Take antibiotics as directed until all the pills are gone. If your medications cause stomach upset, headache, rash, or other abnormal reactions, stop taking them and call your doctor immediately.     WHEN TO CALL THE DOCTOR   Pain, burning, or numbness of the toes not relieved by elevation of the leg   Pale or cold toes, bluish nail beds   Red line going up leg   Excessive swelling   Fever over 100.4 degrees or higher and chills  Pain  not relieved by the pain medication   Excessive bloody drainage or bandage become overly stained with bloody fluids   Your cast/bandage gets wet or you bump or injure the surgical site   Any signs and symptoms of wound infection: redness around the site, pus or green/yellow drainage, foul odor, incision coming up, or warmth around incision.

## 2025-02-07 NOTE — ANESTHESIA PROCEDURE NOTES
Intubation    Date/Time: 2/7/2025 7:11 AM    Performed by: Simi Silverman CRNA  Authorized by: Nabil Irby MD    Intubation:     Induction:  Intravenous    Intubated:  Postinduction    Mask Ventilation:  Easy mask    Attempts:  1    Attempted By:  CRNA    Difficult Airway Encountered?: No      Complications:  None    Airway Device:  Supraglottic airway/LMA    Airway Device Size:  3.0    Style/Cuff Inflation:  Cuffed (inflated to minimal occlusive pressure)    Secured at:  The lips    Placement Verified By:  Capnometry    Complicating Factors:  None    Findings Post-Intubation:  BS equal bilateral  Notes:      Tongue flat

## 2025-02-07 NOTE — ANESTHESIA PREPROCEDURE EVALUATION
02/07/2025  Liegh Oates is a 64 y.o., female.      Pre-op Assessment    I have reviewed the Patient Summary Reports.     I have reviewed the Nursing Notes. I have reviewed the NPO Status.   I have reviewed the Medications.     Review of Systems  Anesthesia Hx:               Denies Personal Hx of Anesthesia complications.                    Cardiovascular:  Exercise tolerance: good                                             Pulmonary:  Pulmonary Normal                       Musculoskeletal:  Arthritis        Arthritis          Neurological:  Neurology Normal              Arthritis                           Endocrine:        Obesity / BMI > 30  Psych:  Psychiatric History                Physical Exam  General: Well nourished, Cooperative and Alert    Airway:  Mallampati: II   Mouth Opening: Normal  TM Distance: Normal  Tongue: Normal    Dental:  Dentures    Chest/Lungs:  Normal Respiratory Rate    Heart:  Rate: Normal      Anesthesia Plan  Type of Anesthesia, risks & benefits discussed:    Anesthesia Type: Gen Supraglottic Airway  Intra-op Monitoring Plan: Standard ASA Monitors  Post Op Pain Control Plan: multimodal analgesia  Induction:  IV  Informed Consent: Informed consent signed with the Patient and all parties understand the risks and agree with anesthesia plan.  All questions answered. Patient consented to blood products? Yes  ASA Score: 2  Day of Surgery Review of History & Physical: H&P Update referred to the surgeon/provider.    Ready For Surgery From Anesthesia Perspective.     .

## 2025-02-08 VITALS
HEART RATE: 73 BPM | TEMPERATURE: 98 F | DIASTOLIC BLOOD PRESSURE: 68 MMHG | WEIGHT: 185.63 LBS | BODY MASS INDEX: 32.89 KG/M2 | OXYGEN SATURATION: 97 % | RESPIRATION RATE: 18 BRPM | SYSTOLIC BLOOD PRESSURE: 116 MMHG | HEIGHT: 63 IN

## (undated) DEVICE — BANDAGE ESMARK ELASTIC ST 4X9

## (undated) DEVICE — BANDAGE MATRIX HK LOOP 4IN 5YD

## (undated) DEVICE — SUT VICRYL CTD 2-0 GI 27 SH

## (undated) DEVICE — SPONGE COTTON TRAY 4X4IN

## (undated) DEVICE — PACK  BASIC ORTHO LAFAYETTE

## (undated) DEVICE — SUT VICRYL 3-0 27 SH

## (undated) DEVICE — BANDAGE GAUZE COT STRL 4.5X4.1

## (undated) DEVICE — SOL NACL IRR 1000ML BTL

## (undated) DEVICE — BLADE LONG 31.0MM X 9.0MM

## (undated) DEVICE — GLOVE SENSICARE PI MICRO 7.5

## (undated) DEVICE — BLADE SURG STAINLESS STEEL #15

## (undated) DEVICE — NDL HYPO REG 25G X 1 1/2

## (undated) DEVICE — DRAPE EXTREMITY ORTHOMAX

## (undated) DEVICE — BLADE SAW LAPIPLASTY 40X11MM

## (undated) DEVICE — SUT VICRYL PLUS 4-0 FS-2 27IN

## (undated) DEVICE — ELECTRODE PATIENT RETURN DISP

## (undated) DEVICE — COVER PROXIMA MAYO STAND

## (undated) DEVICE — NDL SYR 10ML 18X1.5 LL BLUNT

## (undated) DEVICE — DRESSING XEROFORM NONADH 1X8IN

## (undated) DEVICE — SUT VICRYL 5-0 P-3 VC493G

## (undated) DEVICE — SUT ETHILON 4-0 PS4 18 BLK